# Patient Record
Sex: MALE | Race: WHITE | NOT HISPANIC OR LATINO | Employment: OTHER | URBAN - METROPOLITAN AREA
[De-identification: names, ages, dates, MRNs, and addresses within clinical notes are randomized per-mention and may not be internally consistent; named-entity substitution may affect disease eponyms.]

---

## 2023-02-21 ENCOUNTER — DOCUMENTATION (OUTPATIENT)
Dept: PRIMARY CARE | Facility: CLINIC | Age: 88
End: 2023-02-21
Payer: MEDICARE

## 2023-03-31 DIAGNOSIS — I71.20 THORACIC AORTIC ANEURYSM (TAA), UNSPECIFIED PART, UNSPECIFIED WHETHER RUPTURED (CMS-HCC): Primary | ICD-10-CM

## 2023-03-31 RX ORDER — DIGOXIN 125 MCG
TABLET ORAL
Qty: 90 TABLET | Refills: 3 | Status: SHIPPED | OUTPATIENT
Start: 2023-03-31

## 2023-04-20 ENCOUNTER — APPOINTMENT (OUTPATIENT)
Dept: PRIMARY CARE | Facility: CLINIC | Age: 88
End: 2023-04-20
Payer: MEDICARE

## 2023-04-24 ENCOUNTER — APPOINTMENT (OUTPATIENT)
Dept: PRIMARY CARE | Facility: CLINIC | Age: 88
End: 2023-04-24
Payer: MEDICARE

## 2023-04-25 ENCOUNTER — OFFICE VISIT (OUTPATIENT)
Dept: PRIMARY CARE | Facility: CLINIC | Age: 88
End: 2023-04-25
Payer: MEDICARE

## 2023-04-25 VITALS
HEIGHT: 73 IN | DIASTOLIC BLOOD PRESSURE: 58 MMHG | RESPIRATION RATE: 16 BRPM | HEART RATE: 83 BPM | WEIGHT: 191.8 LBS | BODY MASS INDEX: 25.42 KG/M2 | OXYGEN SATURATION: 98 % | SYSTOLIC BLOOD PRESSURE: 118 MMHG

## 2023-04-25 DIAGNOSIS — R32 URINARY INCONTINENCE, UNSPECIFIED TYPE: Primary | ICD-10-CM

## 2023-04-25 DIAGNOSIS — E11.65 TYPE 2 DIABETES MELLITUS WITH HYPERGLYCEMIA, WITHOUT LONG-TERM CURRENT USE OF INSULIN (MULTI): ICD-10-CM

## 2023-04-25 DIAGNOSIS — I71.21 ANEURYSM OF ASCENDING AORTA WITHOUT RUPTURE (CMS-HCC): ICD-10-CM

## 2023-04-25 DIAGNOSIS — E78.2 MIXED HYPERLIPIDEMIA: ICD-10-CM

## 2023-04-25 DIAGNOSIS — L57.0 ACTINIC KERATOSES: ICD-10-CM

## 2023-04-25 DIAGNOSIS — I48.11 LONGSTANDING PERSISTENT ATRIAL FIBRILLATION (MULTI): ICD-10-CM

## 2023-04-25 DIAGNOSIS — E03.9 HYPOTHYROIDISM, UNSPECIFIED TYPE: ICD-10-CM

## 2023-04-25 DIAGNOSIS — K21.9 GASTROESOPHAGEAL REFLUX DISEASE WITHOUT ESOPHAGITIS: ICD-10-CM

## 2023-04-25 DIAGNOSIS — Z12.5 PROSTATE CANCER SCREENING: ICD-10-CM

## 2023-04-25 DIAGNOSIS — M47.26 OSTEOARTHRITIS OF SPINE WITH RADICULOPATHY, LUMBAR REGION: ICD-10-CM

## 2023-04-25 DIAGNOSIS — I10 PRIMARY HYPERTENSION: ICD-10-CM

## 2023-04-25 DIAGNOSIS — E55.9 VITAMIN D DEFICIENCY: ICD-10-CM

## 2023-04-25 DIAGNOSIS — G20.A1 IDIOPATHIC PARKINSON'S DISEASE (MULTI): ICD-10-CM

## 2023-04-25 PROBLEM — R60.0 LOWER EXTREMITY EDEMA: Status: ACTIVE | Noted: 2023-04-25

## 2023-04-25 PROBLEM — N40.0 BPH (BENIGN PROSTATIC HYPERPLASIA): Status: ACTIVE | Noted: 2023-04-25

## 2023-04-25 PROBLEM — I48.91 AFIB (MULTI): Status: ACTIVE | Noted: 2023-04-25

## 2023-04-25 PROBLEM — R53.83 FATIGUE: Status: ACTIVE | Noted: 2023-04-25

## 2023-04-25 PROBLEM — E78.5 HYPERLIPIDEMIA: Status: ACTIVE | Noted: 2023-04-25

## 2023-04-25 PROBLEM — N40.0 ENLARGED PROSTATE: Status: ACTIVE | Noted: 2023-04-25

## 2023-04-25 PROBLEM — J30.9 ALLERGIC RHINITIS: Status: ACTIVE | Noted: 2023-04-25

## 2023-04-25 PROBLEM — G57.93 UNSPECIFIED MONONEUROPATHY OF BILATERAL LOWER LIMBS: Status: ACTIVE | Noted: 2023-04-25

## 2023-04-25 PROBLEM — E11.9 TYPE 2 DIABETES MELLITUS (MULTI): Status: ACTIVE | Noted: 2023-04-25

## 2023-04-25 PROBLEM — G62.9 PN (PERIPHERAL NEUROPATHY): Status: ACTIVE | Noted: 2023-04-25

## 2023-04-25 PROBLEM — Z94.7 HISTORY OF CORNEA TRANSPLANT: Status: ACTIVE | Noted: 2023-04-25

## 2023-04-25 PROCEDURE — 99214 OFFICE O/P EST MOD 30 MIN: CPT | Performed by: FAMILY MEDICINE

## 2023-04-25 RX ORDER — HYDROCHLOROTHIAZIDE 25 MG/1
1 TABLET ORAL 2 TIMES DAILY
COMMUNITY
Start: 2020-11-24 | End: 2023-04-25

## 2023-04-25 RX ORDER — GABAPENTIN 300 MG/1
300 CAPSULE ORAL 2 TIMES DAILY
COMMUNITY
End: 2023-05-24 | Stop reason: SDUPTHER

## 2023-04-25 RX ORDER — PREDNISOLONE ACETATE 10 MG/ML
1 SUSPENSION/ DROPS OPHTHALMIC DAILY
COMMUNITY
Start: 2019-10-09

## 2023-04-25 RX ORDER — DESOXIMETASONE 2.5 MG/G
CREAM TOPICAL 2 TIMES DAILY
COMMUNITY
Start: 2021-08-16

## 2023-04-25 RX ORDER — FOLIC ACID 1 MG/1
1 TABLET ORAL DAILY
COMMUNITY
Start: 2020-02-19

## 2023-04-25 RX ORDER — LORATADINE 10 MG/1
1 TABLET ORAL DAILY
COMMUNITY
Start: 2021-08-16

## 2023-04-25 RX ORDER — DORZOLAMIDE HYDROCHLORIDE AND TIMOLOL MALEATE PRESERVATIVE FREE 20; 5 MG/ML; MG/ML
SOLUTION/ DROPS OPHTHALMIC 2 TIMES DAILY
COMMUNITY

## 2023-04-25 RX ORDER — ROSUVASTATIN CALCIUM 20 MG/1
1 TABLET, COATED ORAL NIGHTLY
COMMUNITY
Start: 2020-02-19

## 2023-04-25 RX ORDER — LEVOTHYROXINE SODIUM 75 UG/1
75 CAPSULE ORAL
COMMUNITY

## 2023-04-25 RX ORDER — MULTIVIT-MIN/FA/LYCOPEN/LUTEIN .4-300-25
1 TABLET ORAL DAILY
COMMUNITY
Start: 2021-10-27

## 2023-04-25 RX ORDER — LISINOPRIL 40 MG/1
40 TABLET ORAL DAILY
COMMUNITY
Start: 2020-08-15 | End: 2023-06-24 | Stop reason: SINTOL

## 2023-04-25 RX ORDER — FINASTERIDE 5 MG/1
1 TABLET, FILM COATED ORAL DAILY
COMMUNITY
Start: 2020-02-28

## 2023-04-25 RX ORDER — SPIRONOLACTONE 25 MG/1
1 TABLET ORAL DAILY
COMMUNITY
Start: 2022-10-24 | End: 2023-04-25 | Stop reason: SINTOL

## 2023-04-25 RX ORDER — AMMONIUM LACTATE 12 G/100G
LOTION TOPICAL 2 TIMES DAILY
COMMUNITY
Start: 2022-10-07

## 2023-04-25 RX ORDER — MINERAL OIL
1 ENEMA (ML) RECTAL DAILY
COMMUNITY
Start: 2022-01-24

## 2023-04-25 RX ORDER — METFORMIN HYDROCHLORIDE 500 MG/1
500 TABLET, EXTENDED RELEASE ORAL
COMMUNITY
Start: 2021-07-14 | End: 2023-06-24 | Stop reason: SDUPTHER

## 2023-04-25 RX ORDER — ORAL SEMAGLUTIDE 7 MG/1
1 TABLET ORAL DAILY
COMMUNITY
Start: 2021-06-17

## 2023-04-25 RX ORDER — BLOOD SUGAR DIAGNOSTIC
STRIP MISCELLANEOUS 2 TIMES DAILY
COMMUNITY
Start: 2021-10-27

## 2023-04-25 ASSESSMENT — ENCOUNTER SYMPTOMS
PALPITATIONS: 0
MYALGIAS: 0
SINUS PAIN: 0
AGITATION: 0
COUGH: 0
FATIGUE: 0
DECREASED CONCENTRATION: 0
DIARRHEA: 0
CONFUSION: 0
FEVER: 0
CONSTIPATION: 0
NERVOUS/ANXIOUS: 0
HYPERTENSION: 1
DIZZINESS: 0
ACTIVITY CHANGE: 0
BLOOD IN STOOL: 0
EYE PAIN: 0
HEMATURIA: 0
DYSURIA: 0
TROUBLE SWALLOWING: 0
ADENOPATHY: 0
SEIZURES: 0
NECK STIFFNESS: 0
SINUS PRESSURE: 0
CHEST TIGHTNESS: 0
PHOTOPHOBIA: 0
RHINORRHEA: 0
SORE THROAT: 0
CONSTITUTIONAL NEGATIVE: 1
FLANK PAIN: 0
ARTHRALGIAS: 0
ABDOMINAL PAIN: 0
POLYDIPSIA: 0
SLEEP DISTURBANCE: 0
APPETITE CHANGE: 0
COLOR CHANGE: 0
RECTAL PAIN: 0
SPEECH DIFFICULTY: 0
SHORTNESS OF BREATH: 0
DYSPHORIC MOOD: 0
STRIDOR: 0
POLYPHAGIA: 0
ABDOMINAL DISTENTION: 0
HEADACHES: 0

## 2023-04-25 NOTE — PROGRESS NOTES
Subjective   Patient ID: Jesus Veliz is a 89 y.o. male who presents for Hypertension and Hyperlipidemia.    Hypertension  Pertinent negatives include no chest pain, headaches, palpitations or shortness of breath.   Hyperlipidemia  Pertinent negatives include no chest pain, myalgias or shortness of breath.        Patient is here for follow up and has stopped a few medications due to urination frequency. He stopped the hydrochlorothiazide and Sprionolactone. He is on Finasteride.   He is having right leg swelling in the lower leg and ankle.    He is using a walker/rolator currently.    He would like to discuss going back on catheter.    Blood work has not been done yet and no refills are needed today.    Review of Systems   Constitutional: Negative.  Negative for activity change, appetite change, fatigue and fever.   HENT:  Negative for congestion, dental problem, ear discharge, ear pain, mouth sores, rhinorrhea, sinus pressure, sinus pain, sore throat, tinnitus and trouble swallowing.    Eyes:  Negative for photophobia, pain and visual disturbance.   Respiratory:  Negative for cough, chest tightness, shortness of breath and stridor.    Cardiovascular:  Negative for chest pain and palpitations.   Gastrointestinal:  Negative for abdominal distention, abdominal pain, blood in stool, constipation, diarrhea and rectal pain.   Endocrine: Negative for cold intolerance, heat intolerance, polydipsia, polyphagia and polyuria.   Genitourinary:  Negative for dysuria, flank pain, hematuria and urgency.   Musculoskeletal:  Negative for arthralgias, gait problem, myalgias and neck stiffness.   Skin:  Negative for color change and rash.   Allergic/Immunologic: Negative for environmental allergies and food allergies.   Neurological:  Negative for dizziness, seizures, syncope, speech difficulty and headaches.   Hematological:  Negative for adenopathy.   Psychiatric/Behavioral:  Negative for agitation, confusion, decreased  "concentration, dysphoric mood and sleep disturbance. The patient is not nervous/anxious.        Objective   /58 (BP Location: Right arm, Patient Position: Sitting, BP Cuff Size: Adult)   Pulse 83   Resp 16   Ht 1.854 m (6' 1\")   Wt 87 kg (191 lb 12.8 oz)   SpO2 98%   BMI 25.30 kg/m²     Physical Exam  Vitals reviewed.   Constitutional:       General: He is not in acute distress.     Appearance: Normal appearance. He is normal weight. He is not ill-appearing or diaphoretic.   HENT:      Head: Normocephalic.      Right Ear: Tympanic membrane and external ear normal.      Left Ear: Tympanic membrane and external ear normal.      Nose: Nose normal. No congestion.      Mouth/Throat:      Mouth: Mucous membranes are dry.      Pharynx: No posterior oropharyngeal erythema.   Eyes:      General:         Right eye: No discharge.         Left eye: No discharge.      Extraocular Movements: Extraocular movements intact.      Conjunctiva/sclera: Conjunctivae normal.      Pupils: Pupils are equal, round, and reactive to light.   Cardiovascular:      Rate and Rhythm: Normal rate and regular rhythm.      Pulses: Normal pulses.      Heart sounds: Normal heart sounds. No murmur heard.  Pulmonary:      Effort: Pulmonary effort is normal. No respiratory distress.      Breath sounds: Normal breath sounds. No wheezing or rales.   Chest:      Chest wall: No tenderness.   Abdominal:      General: Abdomen is flat. Bowel sounds are normal. There is no distension.      Palpations: There is no mass.      Tenderness: There is no abdominal tenderness. There is no guarding.   Genitourinary:     Rectum: Normal.   Musculoskeletal:         General: No tenderness. Normal range of motion.      Cervical back: Normal range of motion and neck supple. No tenderness.      Right lower leg: No edema.      Left lower leg: No edema.   Skin:     General: Skin is warm and dry.      Coloration: Skin is not jaundiced.      Findings: No bruising or " erythema.   Neurological:      General: No focal deficit present.      Mental Status: He is alert and oriented to person, place, and time. Mental status is at baseline.      Cranial Nerves: No cranial nerve deficit.      Sensory: No sensory deficit.      Coordination: Coordination normal.      Gait: Gait normal.   Psychiatric:         Mood and Affect: Mood normal.         Thought Content: Thought content normal.         Judgment: Judgment normal.         Assessment/Plan   Problem List Items Addressed This Visit          Nervous    Idiopathic Parkinson's disease (CMS/HCC)    Relevant Orders    Follow Up In [facility] - PCP    CBC and Auto Differential    Osteoarthritis of spine with radiculopathy, lumbar region    Relevant Orders    Follow Up In [facility] - PCP    CBC and Auto Differential       Circulatory    Thoracic aortic aneurysm (CMS/HCC)    Relevant Orders    Follow Up In [facility] - PCP    CBC and Auto Differential    Afib (CMS/HCC)    Relevant Orders    Follow Up In [facility] - PCP    CBC and Auto Differential    HTN (hypertension)    Relevant Orders    Follow Up In [facility] - PCP    CBC and Auto Differential    Comprehensive Metabolic Panel       Digestive    GERD (gastroesophageal reflux disease)    Relevant Orders    Follow Up In [facility] - PCP    CBC and Auto Differential       Endocrine/Metabolic    Hypothyroidism    Relevant Orders    Free T4 Index    Thyroid Stimulating Hormone    Type 2 diabetes mellitus (CMS/HCC)    Relevant Orders    Follow Up In [facility] - PCP    CBC and Auto Differential       Other    Hyperlipidemia    Relevant Orders    Lipid Panel     Other Visit Diagnoses       Urinary incontinence, unspecified type    -  Primary    Relevant Orders    Referral to Urology    CBC and Auto Differential    Prostate cancer screening        Relevant Orders    Prostate Specific Antigen, Screen    Actinic keratoses                   Continue current medications and therapy for chronic medical conditions        patient dc myrbetriq due to side effects   consider retrying in the future    patient is not an anticoagulation candidate due to falling     continue lac-hydrin 12% for scalp     Discontinue spironolactone & hydrochlorothiazide    Referral to Urology       Patient ID: Jesus Veliz is a 89 y.o. male.    Destruction of lesion    Date/Time: 4/25/2023 3:11 PM    Performed by: Shimon Barakat MD  Authorized by: Shimon Barakat MD    Number of Lesions: 3  Lesion 1:     Body area: upper extremity    Upper extremity location: R lower arm    Malignancy: benign lesion      Destruction method: cryotherapy    Lesion 2:     Body area: head/neck    Head/neck location: scalp    Malignancy: benign lesion    Lesion 3:     Body area: head/neck    Head/neck location: scalp    Malignancy: benign lesion

## 2023-05-02 ENCOUNTER — TELEPHONE (OUTPATIENT)
Dept: PRIMARY CARE | Facility: CLINIC | Age: 88
End: 2023-05-02
Payer: MEDICARE

## 2023-05-02 DIAGNOSIS — N30.00 ACUTE CYSTITIS WITHOUT HEMATURIA: Primary | ICD-10-CM

## 2023-05-02 RX ORDER — NITROFURANTOIN 25; 75 MG/1; MG/1
100 CAPSULE ORAL 2 TIMES DAILY
Qty: 20 CAPSULE | Refills: 0 | Status: SHIPPED | OUTPATIENT
Start: 2023-05-02 | End: 2023-05-12

## 2023-05-02 NOTE — TELEPHONE ENCOUNTER
PT SON CALLED IN AND STATED HE SPOKE WITH [facility] TODAY AND AN ANTIBIOTIC WAS SUPPOSE TO BE CALLED IN FOR UTI. PT HAS NOT RECEIVED THAT MEDICATION.    [facility]

## 2023-05-17 ENCOUNTER — OFFICE VISIT (OUTPATIENT)
Dept: PRIMARY CARE | Facility: CLINIC | Age: 88
End: 2023-05-17
Payer: MEDICARE

## 2023-05-17 VITALS
OXYGEN SATURATION: 99 % | DIASTOLIC BLOOD PRESSURE: 64 MMHG | SYSTOLIC BLOOD PRESSURE: 122 MMHG | BODY MASS INDEX: 25.31 KG/M2 | HEIGHT: 73 IN | RESPIRATION RATE: 16 BRPM | WEIGHT: 191 LBS

## 2023-05-17 DIAGNOSIS — F43.9 STRESS: ICD-10-CM

## 2023-05-17 DIAGNOSIS — I48.91 ATRIAL FIBRILLATION, UNSPECIFIED TYPE (MULTI): ICD-10-CM

## 2023-05-17 DIAGNOSIS — T79.6XXD TRAUMATIC RHABDOMYOLYSIS, SUBSEQUENT ENCOUNTER: ICD-10-CM

## 2023-05-17 DIAGNOSIS — M25.473 ANKLE EDEMA: ICD-10-CM

## 2023-05-17 DIAGNOSIS — I71.21 ANEURYSM OF ASCENDING AORTA WITHOUT RUPTURE (CMS-HCC): ICD-10-CM

## 2023-05-17 DIAGNOSIS — L97.521: ICD-10-CM

## 2023-05-17 DIAGNOSIS — L97.511: ICD-10-CM

## 2023-05-17 DIAGNOSIS — G20.A1 IDIOPATHIC PARKINSON'S DISEASE (MULTI): ICD-10-CM

## 2023-05-17 DIAGNOSIS — E11.65 TYPE 2 DIABETES MELLITUS WITH HYPERGLYCEMIA, WITHOUT LONG-TERM CURRENT USE OF INSULIN (MULTI): ICD-10-CM

## 2023-05-17 DIAGNOSIS — I10 PRIMARY HYPERTENSION: ICD-10-CM

## 2023-05-17 DIAGNOSIS — L03.119 CELLULITIS OF LOWER EXTREMITY, UNSPECIFIED LATERALITY: ICD-10-CM

## 2023-05-17 DIAGNOSIS — N40.1 BENIGN PROSTATIC HYPERPLASIA WITH LOWER URINARY TRACT SYMPTOMS, SYMPTOM DETAILS UNSPECIFIED: ICD-10-CM

## 2023-05-17 DIAGNOSIS — M47.26 OSTEOARTHRITIS OF SPINE WITH RADICULOPATHY, LUMBAR REGION: Primary | ICD-10-CM

## 2023-05-17 PROBLEM — M12.811 ROTATOR CUFF ARTHROPATHY OF RIGHT SHOULDER: Status: ACTIVE | Noted: 2019-11-14

## 2023-05-17 PROBLEM — H43.813 VITREOUS DEGENERATION AND DETACHMENT OF BOTH EYES: Status: ACTIVE | Noted: 2019-02-28

## 2023-05-17 PROBLEM — R41.0 DELIRIUM: Status: ACTIVE | Noted: 2019-12-18

## 2023-05-17 PROBLEM — Z96.1 PSEUDOPHAKIA OF BOTH EYES: Status: ACTIVE | Noted: 2017-07-06

## 2023-05-17 PROBLEM — K61.1 PERI-RECTAL ABSCESS: Status: RESOLVED | Noted: 2023-05-17 | Resolved: 2023-05-17

## 2023-05-17 PROBLEM — K61.1 PERI-RECTAL ABSCESS: Status: ACTIVE | Noted: 2023-05-17

## 2023-05-17 PROBLEM — L73.9 FOLLICULITIS: Status: ACTIVE | Noted: 2023-05-17

## 2023-05-17 PROBLEM — R31.0 GROSS HEMATURIA: Status: ACTIVE | Noted: 2020-02-03

## 2023-05-17 PROBLEM — R06.02 SOB (SHORTNESS OF BREATH) ON EXERTION: Status: ACTIVE | Noted: 2023-05-17

## 2023-05-17 PROBLEM — M25.519 SHOULDER PAIN: Status: ACTIVE | Noted: 2023-05-17

## 2023-05-17 PROBLEM — E83.42 HYPOMAGNESEMIA: Status: ACTIVE | Noted: 2020-01-29

## 2023-05-17 PROBLEM — B95.8 BACTEREMIA DUE TO STAPHYLOCOCCUS: Status: ACTIVE | Noted: 2019-12-16

## 2023-05-17 PROBLEM — R78.81 BACTEREMIA DUE TO STAPHYLOCOCCUS: Status: ACTIVE | Noted: 2019-12-16

## 2023-05-17 PROBLEM — R31.0 GROSS HEMATURIA: Status: RESOLVED | Noted: 2020-02-03 | Resolved: 2023-05-17

## 2023-05-17 PROBLEM — E11.9 TYPE 2 DIABETES MELLITUS WITHOUT RETINOPATHY (MULTI): Status: RESOLVED | Noted: 2017-07-06 | Resolved: 2023-05-17

## 2023-05-17 PROBLEM — M79.676 TOE PAIN, CHRONIC: Status: ACTIVE | Noted: 2023-05-17

## 2023-05-17 PROBLEM — M21.371 RIGHT FOOT DROP: Status: ACTIVE | Noted: 2019-02-18

## 2023-05-17 PROBLEM — T15.01XA CORNEAL FOREIGN BODY, RIGHT, INITIAL ENCOUNTER: Status: ACTIVE | Noted: 2019-09-26

## 2023-05-17 PROBLEM — M17.11 RIGHT KNEE DJD: Status: ACTIVE | Noted: 2018-01-02

## 2023-05-17 PROBLEM — T81.89XA: Status: ACTIVE | Noted: 2017-07-06

## 2023-05-17 PROBLEM — K57.32 DIVERTICULITIS OF COLON: Status: RESOLVED | Noted: 2023-05-17 | Resolved: 2023-05-17

## 2023-05-17 PROBLEM — R26.9 ABNORMALITY OF GAIT: Status: ACTIVE | Noted: 2019-02-18

## 2023-05-17 PROBLEM — M25.531 PAIN IN RIGHT WRIST: Status: ACTIVE | Noted: 2017-05-22

## 2023-05-17 PROBLEM — S09.93XA DENTAL TRAUMA: Status: ACTIVE | Noted: 2023-05-17

## 2023-05-17 PROBLEM — Z85.46 HISTORY OF PROSTATE CANCER: Status: ACTIVE | Noted: 2017-12-29

## 2023-05-17 PROBLEM — T79.6XXA TRAUMATIC RHABDOMYOLYSIS (CMS-HCC): Status: ACTIVE | Noted: 2019-12-15

## 2023-05-17 PROBLEM — H02.132 SENILE ECTROPION OF BOTH LOWER EYELIDS: Status: ACTIVE | Noted: 2017-07-06

## 2023-05-17 PROBLEM — I51.7 CARDIOMEGALY: Status: ACTIVE | Noted: 2017-12-29

## 2023-05-17 PROBLEM — M48.062 SPINAL STENOSIS OF LUMBAR REGION WITH NEUROGENIC CLAUDICATION: Status: ACTIVE | Noted: 2019-11-14

## 2023-05-17 PROBLEM — M00.061 STAPHYLOCOCCAL ARTHRITIS OF RIGHT KNEE (MULTI): Status: RESOLVED | Noted: 2017-11-28 | Resolved: 2023-05-17

## 2023-05-17 PROBLEM — H02.135 SENILE ECTROPION OF BOTH LOWER EYELIDS: Status: ACTIVE | Noted: 2017-07-06

## 2023-05-17 PROBLEM — M00.061 STAPHYLOCOCCAL ARTHRITIS OF RIGHT KNEE (MULTI): Status: ACTIVE | Noted: 2017-11-28
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
## 2023-05-17 PROBLEM — H57.9 EYE PRESSURE: Status: ACTIVE | Noted: 2023-05-17

## 2023-05-17 PROBLEM — E11.9 TYPE 2 DIABETES MELLITUS WITHOUT RETINOPATHY (MULTI): Status: ACTIVE | Noted: 2017-07-06

## 2023-05-17 PROBLEM — I95.1 AUTONOMIC POSTURAL HYPOTENSION: Status: ACTIVE | Noted: 2020-02-03

## 2023-05-17 PROBLEM — L82.0 INFLAMED SEBORRHEIC KERATOSIS: Status: ACTIVE | Noted: 2018-03-14

## 2023-05-17 PROBLEM — K57.32 DIVERTICULITIS OF COLON: Status: ACTIVE | Noted: 2023-05-17

## 2023-05-17 PROBLEM — R33.9 URINARY RETENTION: Status: ACTIVE | Noted: 2020-01-29

## 2023-05-17 PROBLEM — R35.0 INCREASED FREQUENCY OF URINATION: Status: ACTIVE | Noted: 2017-11-28

## 2023-05-17 PROBLEM — L72.0 INCLUSION CYST: Status: ACTIVE | Noted: 2018-08-13

## 2023-05-17 PROBLEM — Z98.890 S/P LASIK SURGERY: Status: ACTIVE | Noted: 2017-07-06

## 2023-05-17 PROBLEM — H17.11 CENTRAL CORNEAL OPACITY OF RIGHT EYE: Status: ACTIVE | Noted: 2019-02-28

## 2023-05-17 PROBLEM — H35.3130 BILATERAL NONEXUDATIVE AGE-RELATED MACULAR DEGENERATION: Status: ACTIVE | Noted: 2017-07-06

## 2023-05-17 PROBLEM — K56.600 SMALL BOWEL OBSTRUCTION, PARTIAL (MULTI): Status: RESOLVED | Noted: 2023-05-17 | Resolved: 2023-05-17

## 2023-05-17 PROBLEM — Z96.659 HISTORY OF TOTAL KNEE REPLACEMENT: Status: ACTIVE | Noted: 2018-03-14

## 2023-05-17 PROBLEM — Z22.322 CARRIER OF METHICILLIN RESISTANT STAPHYLOCOCCUS AUREUS: Status: ACTIVE | Noted: 2017-11-28

## 2023-05-17 PROBLEM — D64.9 CHRONIC ANEMIA: Status: ACTIVE | Noted: 2019-12-15

## 2023-05-17 PROBLEM — K56.600 SMALL BOWEL OBSTRUCTION, PARTIAL (MULTI): Status: ACTIVE | Noted: 2023-05-17

## 2023-05-17 PROBLEM — L23.9 ALLERGIC DERMATITIS: Status: ACTIVE | Noted: 2023-05-17

## 2023-05-17 PROBLEM — R31.9 HEMATURIA: Status: ACTIVE | Noted: 2020-01-29

## 2023-05-17 PROBLEM — F06.4 ANXIETY DISORDER DUE TO GENERAL MEDICAL CONDITION: Status: ACTIVE | Noted: 2020-11-06

## 2023-05-17 PROBLEM — L03.113 CELLULITIS OF HAND, RIGHT: Status: ACTIVE | Noted: 2023-05-17

## 2023-05-17 PROBLEM — H52.203: Status: ACTIVE | Noted: 2017-07-06

## 2023-05-17 PROBLEM — L03.116 CELLULITIS OF LEFT LOWER LEG: Status: ACTIVE | Noted: 2023-05-17

## 2023-05-17 PROBLEM — E43 SEVERE PROTEIN-CALORIE MALNUTRITION (MULTI): Status: ACTIVE | Noted: 2020-01-31

## 2023-05-17 PROBLEM — M54.10 RADICULAR PAIN OF RIGHT LOWER EXTREMITY: Status: ACTIVE | Noted: 2019-11-14

## 2023-05-17 PROBLEM — D04.4 SQUAMOUS CELL CARCINOMA IN SITU OF SCALP: Status: ACTIVE | Noted: 2018-08-07

## 2023-05-17 PROBLEM — L30.9 ECZEMA: Status: ACTIVE | Noted: 2018-03-14

## 2023-05-17 PROBLEM — G89.29 TOE PAIN, CHRONIC: Status: ACTIVE | Noted: 2023-05-17

## 2023-05-17 PROBLEM — B35.1 FUNGAL NAIL INFECTION: Status: ACTIVE | Noted: 2023-05-17

## 2023-05-17 PROCEDURE — 3074F SYST BP LT 130 MM HG: CPT | Performed by: FAMILY MEDICINE

## 2023-05-17 PROCEDURE — 99214 OFFICE O/P EST MOD 30 MIN: CPT | Performed by: FAMILY MEDICINE

## 2023-05-17 PROCEDURE — 1160F RVW MEDS BY RX/DR IN RCRD: CPT | Performed by: FAMILY MEDICINE

## 2023-05-17 PROCEDURE — 3078F DIAST BP <80 MM HG: CPT | Performed by: FAMILY MEDICINE

## 2023-05-17 PROCEDURE — 1036F TOBACCO NON-USER: CPT | Performed by: FAMILY MEDICINE

## 2023-05-17 PROCEDURE — 1159F MED LIST DOCD IN RCRD: CPT | Performed by: FAMILY MEDICINE

## 2023-05-17 PROCEDURE — 1157F ADVNC CARE PLAN IN RCRD: CPT | Performed by: FAMILY MEDICINE

## 2023-05-17 RX ORDER — SPIRONOLACTONE 25 MG/1
25 TABLET ORAL 2 TIMES DAILY
Qty: 60 TABLET | Refills: 1 | Status: SHIPPED | OUTPATIENT
Start: 2023-05-17 | End: 2023-05-17 | Stop reason: SDUPTHER

## 2023-05-17 RX ORDER — CEFUROXIME AXETIL 250 MG/1
250 TABLET ORAL 2 TIMES DAILY
Qty: 28 TABLET | Refills: 0 | Status: SHIPPED | OUTPATIENT
Start: 2023-05-17 | End: 2023-05-31

## 2023-05-17 RX ORDER — SPIRONOLACTONE 25 MG/1
25 TABLET ORAL 2 TIMES DAILY
Qty: 60 TABLET | Refills: 1 | Status: SHIPPED | OUTPATIENT
Start: 2023-05-17 | End: 2023-06-24 | Stop reason: SDUPTHER

## 2023-05-17 RX ORDER — RISPERIDONE 0.25 MG/1
0.25 TABLET ORAL NIGHTLY
Qty: 30 TABLET | Refills: 1 | Status: SHIPPED | OUTPATIENT
Start: 2023-05-17 | End: 2023-06-23

## 2023-05-17 ASSESSMENT — ENCOUNTER SYMPTOMS
OCCASIONAL FEELINGS OF UNSTEADINESS: 1
DEPRESSION: 0
LOSS OF SENSATION IN FEET: 1

## 2023-05-17 NOTE — PROGRESS NOTES
Subjective   Patient ID: Jesus Veliz is a 89 y.o. male who presents for Foot Swelling.    Pt is in for left leg swelling due to a wound. X 1 week     Edema    Presents with recurrent edema. The onset of the episode was gradual. The edema is present on the both (wounds on his left foot) side(s).        Review of Systems  12 Systems have been reviewed as follows.  Constitutional: Fever, weight gain, weight loss, appetite change, night sweats, fatigue, chills.  Eyes : blurry, double vision, vision, loss, tearing, redness, pain, sensitivity to light, glaucoma.  Ears, nose, mouth, and throat: Hearing loss, ringing in the ears, ear pain, nasal congestion, nasal drainage, nosebleeds, mouth, throat, irritation tooth problem.  Cardiovascular :chest pain, pressure, heart racing, palpitations, sweating, leg swelling, high or low blood pressure  Pulmonary: Cough, yellow or green sputum, blood and sputum, shortness of breath, wheezing  Gastrointestinal: Nausea, vomiting, diarrhea, constipation, pain, blood in stool, or vomitus, heartburn, difficulty swallowing  Genitourinary: incontinence, abnormal bleeding, abnormal discharge, urinary frequency, urinary hesitancy, pain, impotence sexual problem, infection, urinary retention  Musculoskeletal: Pain, stiffness, joint, redness or warmth, arthritis, back pain, weakness, muscle wasting, sprain or fracture  Neuro: Weight weakness, dizziness, change in voice, change in taste change in vision, change in hearing, loss, or change of sensation, trouble walking, balance problems coordination problems, shaking, speech problem  Endocrine , cold or heat intolerance, blood sugar problem, weight gain or loss missed periods hot flashes, sweats, change in body hair, change in libido, increased thirst, increased urination  Heme/lymph: Swelling, bleeding, problem anemia, bruising, enlarged lymph nodes  Allergic/immunologic: H. plus nasal drip, watery itchy eyes, nasal drainage,  "immunosuppressed  The above were reviewed and noted negative except as noted in HPI and Problem List.    Objective   /64   Resp 16   Ht 1.854 m (6' 1\")   Wt 86.6 kg (191 lb)   SpO2 99%   BMI 25.20 kg/m²     Physical Exam  Constitutional: Well developed, well nourished, alert and in no acute distress   Eyes: Normal external exam. Pupils equally round and reactive to light with normal accommodation and extraocular movements intact.  Neck: Supple, no lymphadenopathy or masses.   Cardiovascular: Regular rate and rhythm, normal S1 and S2, no murmurs, gallops, or rubs. Radial pulses normal. No peripheral edema.  Pulmonary: No respiratory distress, lungs clear to auscultation bilaterally. No wheezes, rhonchi, rales.  Abdomen: soft,non tender, non distended, without masses or HSM  Skin: Warm, well perfused, normal skin turgor and color.   Neurologic: Cranial nerves II-XII grossly intact.   Psychiatric: Mood calm and affect normal  Musculoskeletal: Moving all extremities without restriction    Assessment/Plan   Problem List Items Addressed This Visit          Nervous    Idiopathic Parkinson's disease (CMS/HCC)    Relevant Orders    Follow Up In [facility] - PCP    Osteoarthritis of spine with radiculopathy, lumbar region - Primary    Relevant Orders    Follow Up In [facility] - PCP       Circulatory    Thoracic aortic aneurysm (CMS/HCC)    Relevant Orders    Follow Up In [facility] - PCP    HTN (hypertension)    Relevant Orders    Follow Up In [facility] - PCP    A-fib (CMS/HCC)    Relevant Orders    Follow Up In [facility] - PCP       Genitourinary    BPH (benign prostatic hyperplasia)    Relevant Orders    Follow Up In [facility] - PCP       Musculoskeletal    Traumatic rhabdomyolysis (CMS/[facility])    Relevant Orders    Follow Up In [facility] - PCP       Endocrine/Metabolic    Type 2 diabetes mellitus (CMS/HCC)    Relevant Orders    Follow " Up In [facility] - PCP     Other Visit Diagnoses       Cellulitis of lower extremity, unspecified laterality        Relevant Medications    cefuroxime (Ceftin) 250 mg tablet    Other Relevant Orders    Referral to Wound Clinic    Follow Up In [facility] - PCP    Ulcer of both feet, limited to breakdown of skin (CMS/[facility])        Relevant Medications    cefuroxime (Ceftin) 250 mg tablet    Other Relevant Orders    Referral to Wound Clinic    Follow Up In [facility] - PCP    Ankle edema        Relevant Medications    spironolactone (Aldactone) 25 mg tablet    Other Relevant Orders    Follow Up In [facility] - PCP    Stress        Relevant Medications    risperiDONE (RisperDAL) 0.25 mg tablet         See prev visit    Get  bw  Continue current medications and therapy for chronic medical conditions

## 2023-05-19 ENCOUNTER — DOCUMENTATION (OUTPATIENT)
Dept: PRIMARY CARE | Facility: CLINIC | Age: 88
End: 2023-05-19
Payer: MEDICARE

## 2023-05-21 LAB
GRAM STAIN: ABNORMAL
TISSUE/WOUND CULTURE/SMEAR: ABNORMAL

## 2023-05-24 ENCOUNTER — TELEMEDICINE (OUTPATIENT)
Dept: PRIMARY CARE | Facility: CLINIC | Age: 88
End: 2023-05-24
Payer: MEDICARE

## 2023-05-24 DIAGNOSIS — L03.119 CELLULITIS OF LOWER EXTREMITY, UNSPECIFIED LATERALITY: ICD-10-CM

## 2023-05-24 DIAGNOSIS — L97.521: ICD-10-CM

## 2023-05-24 DIAGNOSIS — M47.26 OSTEOARTHRITIS OF SPINE WITH RADICULOPATHY, LUMBAR REGION: ICD-10-CM

## 2023-05-24 DIAGNOSIS — L97.511: ICD-10-CM

## 2023-05-24 DIAGNOSIS — I10 PRIMARY HYPERTENSION: ICD-10-CM

## 2023-05-24 DIAGNOSIS — I48.91 ATRIAL FIBRILLATION, UNSPECIFIED TYPE (MULTI): ICD-10-CM

## 2023-05-24 DIAGNOSIS — N40.1 BENIGN PROSTATIC HYPERPLASIA WITH LOWER URINARY TRACT SYMPTOMS, SYMPTOM DETAILS UNSPECIFIED: ICD-10-CM

## 2023-05-24 DIAGNOSIS — G20.A1 IDIOPATHIC PARKINSON'S DISEASE (MULTI): ICD-10-CM

## 2023-05-24 DIAGNOSIS — T79.6XXD TRAUMATIC RHABDOMYOLYSIS, SUBSEQUENT ENCOUNTER: ICD-10-CM

## 2023-05-24 DIAGNOSIS — M25.473 ANKLE EDEMA: ICD-10-CM

## 2023-05-24 DIAGNOSIS — E11.65 TYPE 2 DIABETES MELLITUS WITH HYPERGLYCEMIA, WITHOUT LONG-TERM CURRENT USE OF INSULIN (MULTI): ICD-10-CM

## 2023-05-24 DIAGNOSIS — I71.21 ANEURYSM OF ASCENDING AORTA WITHOUT RUPTURE (CMS-HCC): ICD-10-CM

## 2023-05-24 PROBLEM — Z94.7 STATUS POST CORNEAL TRANSPLANT: Status: ACTIVE | Noted: 2017-07-06

## 2023-05-24 PROBLEM — G47.00 INSOMNIA: Status: ACTIVE | Noted: 2020-01-29

## 2023-05-24 PROBLEM — L57.0 ACTINIC KERATOSES: Status: ACTIVE | Noted: 2018-03-14

## 2023-05-24 PROBLEM — R53.1 GENERALIZED WEAKNESS: Status: ACTIVE | Noted: 2023-05-24

## 2023-05-24 PROBLEM — Z96.651 S/P TOTAL KNEE REPLACEMENT, RIGHT: Status: ACTIVE | Noted: 2018-03-14

## 2023-05-24 PROBLEM — Z94.7 S/P PKP (PENETRATING KERATOPLASTY): Status: ACTIVE | Noted: 2017-07-06

## 2023-05-24 PROCEDURE — 99214 OFFICE O/P EST MOD 30 MIN: CPT | Performed by: FAMILY MEDICINE

## 2023-05-24 RX ORDER — GABAPENTIN 300 MG/1
300 CAPSULE ORAL 2 TIMES DAILY
Qty: 180 CAPSULE | Refills: 1 | Status: SHIPPED | OUTPATIENT
Start: 2023-05-24

## 2023-05-24 NOTE — PROGRESS NOTES
Subjective   Patient ID: Jesus Veliz is a 89 y.o. male who presents for Edema and Cellulitis.    Edema    The problem has been rapidly improving. Risk factors for edema include recent leg injury.   Associated agents include no associated agents.   Treatments tried include diuretics. There has been moderate improvement on treatment(s).        Review of Systems  12 Systems have been reviewed as follows.  Constitutional: Fever, weight gain, weight loss, appetite change, night sweats, fatigue, chills.  Eyes : blurry, double vision, vision, loss, tearing, redness, pain, sensitivity to light, glaucoma.  Ears, nose, mouth, and throat: Hearing loss, ringing in the ears, ear pain, nasal congestion, nasal drainage, nosebleeds, mouth, throat, irritation tooth problem.  Cardiovascular :chest pain, pressure, heart racing, palpitations, sweating, leg swelling, high or low blood pressure  Pulmonary: Cough, yellow or green sputum, blood and sputum, shortness of breath, wheezing  Gastrointestinal: Nausea, vomiting, diarrhea, constipation, pain, blood in stool, or vomitus, heartburn, difficulty swallowing  Genitourinary: incontinence, abnormal bleeding, abnormal discharge, urinary frequency, urinary hesitancy, pain, impotence sexual problem, infection, urinary retention  Musculoskeletal: Pain, stiffness, joint, redness or warmth, arthritis, back pain, weakness, muscle wasting, sprain or fracture  Neuro: Weight weakness, dizziness, change in voice, change in taste change in vision, change in hearing, loss, or change of sensation, trouble walking, balance problems coordination problems, shaking, speech problem  Endocrine , cold or heat intolerance, blood sugar problem, weight gain or loss missed periods hot flashes, sweats, change in body hair, change in libido, increased thirst, increased urination  Heme/lymph: Swelling, bleeding, problem anemia, bruising, enlarged lymph nodes  Allergic/immunologic: H. plus nasal drip, watery itchy  eyes, nasal drainage, immunosuppressed  The above were reviewed and noted negative except as noted in HPI and Problem List.    Objective   There were no vitals taken for this visit.    Physical Exam  Constitutional: Well developed, well nourished, alert and in no acute distress     Assessment/Plan   Problem List Items Addressed This Visit          Nervous    Idiopathic Parkinson's disease (CMS/HCC)    Relevant Orders    Follow Up In [facility] - PCP    Osteoarthritis of spine with radiculopathy, lumbar region    Relevant Medications    gabapentin (Neurontin) 300 mg capsule    Other Relevant Orders    Follow Up In [facility] - PCP       Circulatory    Thoracic aortic aneurysm (CMS/HCC)    Relevant Orders    Follow Up In [facility] - PCP    HTN (hypertension)    Relevant Orders    Follow Up In [facility] - PCP    A-fib (CMS/HCC)    Relevant Orders    Follow Up In [facility] - PCP       Genitourinary    BPH (benign prostatic hyperplasia)    Relevant Orders    Follow Up In [facility] - PCP       Musculoskeletal    Traumatic rhabdomyolysis (CMS/[facility])    Relevant Orders    Follow Up In [facility] - PCP       Endocrine/Metabolic    Type 2 diabetes mellitus (CMS/HCC)    Relevant Orders    Follow Up In [facility] - PCP     Other Visit Diagnoses       Cellulitis of lower extremity, unspecified laterality        Relevant Orders    Follow Up In [facility] - PCP    Ulcer of both feet, limited to breakdown of skin (CMS/HCC)        Relevant Orders    Follow Up In [facility] - PCP    Ankle edema        Relevant Orders    Follow Up In [facility] - PCP        Continue current medications and therapy for chronic medical conditions    Virtual Visit - Audio and Visual Communication Real Time     See urology    See visit 4/25

## 2023-05-24 NOTE — PROGRESS NOTES
Subjective   Patient ID: Jesus Veliz is a 89 y.o. male who presents for Edema and Cellulitis.    HPI     Review of Systems    Objective   There were no vitals taken for this visit.    Physical Exam    Assessment/Plan

## 2023-06-10 ENCOUNTER — NURSING HOME VISIT (OUTPATIENT)
Dept: POST ACUTE CARE | Facility: EXTERNAL LOCATION | Age: 88
End: 2023-06-10
Payer: MEDICARE

## 2023-06-10 DIAGNOSIS — E11.42 TYPE 2 DIABETES MELLITUS WITH DIABETIC POLYNEUROPATHY, WITHOUT LONG-TERM CURRENT USE OF INSULIN (MULTI): ICD-10-CM

## 2023-06-10 DIAGNOSIS — E03.9 HYPOTHYROIDISM, UNSPECIFIED TYPE: ICD-10-CM

## 2023-06-10 DIAGNOSIS — G20.A1 PARKINSON'S DISEASE (MULTI): ICD-10-CM

## 2023-06-10 DIAGNOSIS — G20.A1 MODERATE DEMENTIA DUE TO PARKINSON'S DISEASE, WITH AGITATION (MULTI): ICD-10-CM

## 2023-06-10 DIAGNOSIS — R33.9 URINARY RETENTION WITH INCOMPLETE BLADDER EMPTYING: Primary | ICD-10-CM

## 2023-06-10 DIAGNOSIS — Z85.46 HISTORY OF PROSTATE CANCER: ICD-10-CM

## 2023-06-10 DIAGNOSIS — F02.B11 MODERATE DEMENTIA DUE TO PARKINSON'S DISEASE, WITH AGITATION (MULTI): ICD-10-CM

## 2023-06-10 PROCEDURE — 99306 1ST NF CARE HIGH MDM 50: CPT | Performed by: FAMILY MEDICINE

## 2023-06-10 NOTE — LETTER
Patient: Jesus Veliz  : 10/2/1929    Encounter Date: 06/10/2023    Documentation at [facility]    Problem List Items Addressed This Visit          Nervous    Parkinson's disease (CMS/HCC)       Genitourinary    Urinary retention with incomplete bladder emptying - Primary       Endocrine/Metabolic    Hypothyroidism    Type 2 diabetes mellitus (CMS/HCC)       Other    History of prostate cancer     Left message for patient's son, Vijay Veliz.      Electronically Signed By: Santy Zavaleta DO   23  7:59 PM

## 2023-06-11 PROBLEM — F02.B11: Status: ACTIVE | Noted: 2023-04-25

## 2023-06-11 NOTE — PROGRESS NOTES
Documentation at [facility]    Problem List Items Addressed This Visit          Nervous    Parkinson's disease (CMS/HCC)       Genitourinary    Urinary retention with incomplete bladder emptying - Primary       Endocrine/Metabolic    Hypothyroidism    Type 2 diabetes mellitus (CMS/HCC)       Other    History of prostate cancer     Left message for patient's son, Vijay Veliz.

## 2023-06-22 ENCOUNTER — TELEPHONE (OUTPATIENT)
Dept: PRIMARY CARE | Facility: CLINIC | Age: 88
End: 2023-06-22

## 2023-06-22 RX ORDER — LISINOPRIL 10 MG/1
40 TABLET ORAL DAILY
COMMUNITY

## 2023-06-22 NOTE — TELEPHONE ENCOUNTER
Called patients son and left message to return call ASAP to review his fathers medications as he manages them for the patient

## 2023-06-22 NOTE — TELEPHONE ENCOUNTER
NURSE FROM [facility] NEEDS A C/B ASAP FROM AN MEDICAL ASSISTANT/ DOCTOR TO VERIFY AND GO OVER CURRENT MEDICATION SO THAT THEY CAN GET THE PT STARTED ON HIS DAILY DOSES.  PT WAS JUST SENT OVER TO [facility] FROM [facility] PER NURSE KEV.     KEV NUMBER [phone]

## 2023-06-23 ENCOUNTER — DOCUMENTATION (OUTPATIENT)
Dept: PRIMARY CARE | Facility: CLINIC | Age: 88
End: 2023-06-23
Payer: MEDICARE

## 2023-06-23 RX ORDER — TAMSULOSIN HYDROCHLORIDE 0.4 MG/1
0.4 CAPSULE ORAL
COMMUNITY

## 2023-06-23 NOTE — PROGRESS NOTES
Discharge Facility: [facility]  Discharge Diagnosis: Urinary retention  Admission Date: 6/8/2023  Discharge Date: 6/22/2023    PCP Appointment Date: TBD-patient unsure if he will continue with PCP or go with facility doctor now that he is at [facility]  Specialist Appointment Date: TBD  Hospital Encounter and Summary: Linked  See discharge assessment below for further details  Engagement  Call Start Time: 0833 (6/23/2023  8:42 AM)    Medications  Medications reviewed with patient/caregiver?: Yes (6/23/2023  8:42 AM)  Is the patient having any side effects they believe may be caused by any medication additions or changes?: No (6/23/2023  8:42 AM)  Does the patient have all medications ordered at discharge?: Yes (6/23/2023  8:42 AM)  Care Management Interventions: No intervention needed (6/23/2023  8:42 AM)  Prescription Comments: see med list (6/23/2023  8:42 AM)  Is the patient taking all medications as directed (includes completed medication regime)?: Yes (6/23/2023  8:42 AM)    Appointments  Does the patient have a primary care provider?: Yes (6/23/2023  8:42 AM)  Care Management Interventions: Advised patient to make appointment (Patient unsure if he will continue care with PCP or will switch to Assisted living provider. Decision to be made after provider at facility sees patient on Monday.) (6/23/2023  8:42 AM)  Has the patient kept scheduled appointments due by today?: Yes (6/23/2023  8:42 AM)    Self Management  What is the home health agency?: [facility] [phone] (6/23/2023  8:42 AM)  What Durable Medical Equipment (DME) was ordered?: pringle catheter (6/23/2023  8:42 AM)    Patient Teaching  Does the patient have access to their discharge instructions?: Yes (6/23/2023  8:42 AM)  Care Management Interventions: Reviewed instructions with patient (Reviewed with AL nurse) (6/23/2023  8:42 AM)  What is the patient's perception of their health status since discharge?: Returned to  baseline/stable (6/23/2023  8:42 AM)  Is the patient/caregiver able to teach back the hierarchy of who to call/visit for symptoms/problems? PCP, Specialist, Home Health nurse, Urgent Care, ED, 911: Yes (6/23/2023  8:42 AM)

## 2023-06-23 NOTE — TELEPHONE ENCOUNTER
PT NEEDS A VERBAL ORDER TO OKAY START OF CARE THROUGH [facility].  [phone] CB OR MA PLEASE CALL TO GIVE VERBAL ORDER PLEASE

## 2023-06-24 DIAGNOSIS — M25.473 ANKLE EDEMA: ICD-10-CM

## 2023-06-24 DIAGNOSIS — E11.65 TYPE 2 DIABETES MELLITUS WITH HYPERGLYCEMIA, WITHOUT LONG-TERM CURRENT USE OF INSULIN (MULTI): Primary | ICD-10-CM

## 2023-06-24 RX ORDER — METFORMIN HYDROCHLORIDE 500 MG/1
1000 TABLET, EXTENDED RELEASE ORAL
Qty: 120 TABLET | Refills: 2
Start: 2023-06-24

## 2023-06-24 RX ORDER — SPIRONOLACTONE 25 MG/1
25 TABLET ORAL 2 TIMES DAILY
Qty: 60 TABLET | Refills: 1 | Status: SHIPPED | OUTPATIENT
Start: 2023-06-24 | End: 2023-12-21

## 2023-06-24 NOTE — TELEPHONE ENCOUNTER
Pt nurse called back upset and they need an updated list on how patient is taken medication and faxed over to . She called on Sat Very upset because they can't give patient correct medication until they receive the list.

## 2023-06-26 DIAGNOSIS — I10 PRIMARY HYPERTENSION: ICD-10-CM

## 2023-06-26 DIAGNOSIS — E11.65 TYPE 2 DIABETES MELLITUS WITH HYPERGLYCEMIA, WITHOUT LONG-TERM CURRENT USE OF INSULIN (MULTI): ICD-10-CM

## 2023-06-27 RX ORDER — LISINOPRIL 40 MG/1
TABLET ORAL
Qty: 90 TABLET | Refills: 3 | Status: SHIPPED | OUTPATIENT
Start: 2023-06-27

## 2023-06-27 RX ORDER — EMPAGLIFLOZIN 25 MG/1
TABLET, FILM COATED ORAL
Qty: 90 TABLET | Refills: 3 | Status: SHIPPED | OUTPATIENT
Start: 2023-06-27

## 2023-06-29 ENCOUNTER — TELEPHONE (OUTPATIENT)
Dept: PRIMARY CARE | Facility: CLINIC | Age: 88
End: 2023-06-29

## 2023-06-29 NOTE — TELEPHONE ENCOUNTER
Dr. Yuval Morillo from [facility] is calling to report that they did PT eval and they will see patient 1-2 times a week. Patient is currently admitted to the hospital, but they will continue once he discharged.

## 2025-08-27 ENCOUNTER — TELEPHONE (OUTPATIENT)
Dept: PRIMARY CARE | Facility: CLINIC | Age: OVER 89
End: 2025-08-27
Payer: MEDICARE